# Patient Record
Sex: FEMALE | Race: WHITE | NOT HISPANIC OR LATINO | Employment: FULL TIME | URBAN - NONMETROPOLITAN AREA
[De-identification: names, ages, dates, MRNs, and addresses within clinical notes are randomized per-mention and may not be internally consistent; named-entity substitution may affect disease eponyms.]

---

## 2021-09-05 ENCOUNTER — HOSPITAL ENCOUNTER (OUTPATIENT)
Facility: OTHER | Age: 31
Discharge: OTHER INSTITUTION | End: 2021-09-05
Attending: STUDENT IN AN ORGANIZED HEALTH CARE EDUCATION/TRAINING PROGRAM | Admitting: STUDENT IN AN ORGANIZED HEALTH CARE EDUCATION/TRAINING PROGRAM
Payer: COMMERCIAL

## 2021-09-05 ENCOUNTER — HOSPITAL ENCOUNTER (EMERGENCY)
Facility: OTHER | Age: 31
Discharge: STILL ADMITTED OR ADMINISTRATIVE | End: 2021-09-05
Payer: COMMERCIAL

## 2021-09-05 VITALS
HEIGHT: 67 IN | SYSTOLIC BLOOD PRESSURE: 129 MMHG | HEART RATE: 122 BPM | WEIGHT: 160 LBS | OXYGEN SATURATION: 97 % | RESPIRATION RATE: 18 BRPM | TEMPERATURE: 98.6 F | DIASTOLIC BLOOD PRESSURE: 70 MMHG | BODY MASS INDEX: 25.11 KG/M2

## 2021-09-05 LAB
ABO/RH(D): NORMAL
ANTIBODY SCREEN: NEGATIVE
CLUE CELLS: ABNORMAL
FFN SPECIMEN INTEGRITY: NORMAL
FIBRONECTIN FETAL VAG QL: NEGATIVE
SPECIMEN EXPIRATION DATE: NORMAL
TRICHOMONAS, WET PREP: ABNORMAL
WBC'S/HIGH POWER FIELD, WET PREP: ABNORMAL
YEAST, WET PREP: ABNORMAL

## 2021-09-05 PROCEDURE — 36415 COLL VENOUS BLD VENIPUNCTURE: CPT | Performed by: STUDENT IN AN ORGANIZED HEALTH CARE EDUCATION/TRAINING PROGRAM

## 2021-09-05 PROCEDURE — C9803 HOPD COVID-19 SPEC COLLECT: HCPCS

## 2021-09-05 PROCEDURE — 87081 CULTURE SCREEN ONLY: CPT | Performed by: STUDENT IN AN ORGANIZED HEALTH CARE EDUCATION/TRAINING PROGRAM

## 2021-09-05 PROCEDURE — 87210 SMEAR WET MOUNT SALINE/INK: CPT | Performed by: STUDENT IN AN ORGANIZED HEALTH CARE EDUCATION/TRAINING PROGRAM

## 2021-09-05 PROCEDURE — 82731 ASSAY OF FETAL FIBRONECTIN: CPT | Performed by: STUDENT IN AN ORGANIZED HEALTH CARE EDUCATION/TRAINING PROGRAM

## 2021-09-05 PROCEDURE — 96374 THER/PROPH/DIAG INJ IV PUSH: CPT

## 2021-09-05 PROCEDURE — 96372 THER/PROPH/DIAG INJ SC/IM: CPT | Mod: XU | Performed by: STUDENT IN AN ORGANIZED HEALTH CARE EDUCATION/TRAINING PROGRAM

## 2021-09-05 PROCEDURE — 85027 COMPLETE CBC AUTOMATED: CPT | Performed by: STUDENT IN AN ORGANIZED HEALTH CARE EDUCATION/TRAINING PROGRAM

## 2021-09-05 PROCEDURE — U0003 INFECTIOUS AGENT DETECTION BY NUCLEIC ACID (DNA OR RNA); SEVERE ACUTE RESPIRATORY SYNDROME CORONAVIRUS 2 (SARS-COV-2) (CORONAVIRUS DISEASE [COVID-19]), AMPLIFIED PROBE TECHNIQUE, MAKING USE OF HIGH THROUGHPUT TECHNOLOGIES AS DESCRIBED BY CMS-2020-01-R: HCPCS | Performed by: STUDENT IN AN ORGANIZED HEALTH CARE EDUCATION/TRAINING PROGRAM

## 2021-09-05 PROCEDURE — 250N000011 HC RX IP 250 OP 636: Performed by: STUDENT IN AN ORGANIZED HEALTH CARE EDUCATION/TRAINING PROGRAM

## 2021-09-05 PROCEDURE — G0463 HOSPITAL OUTPT CLINIC VISIT: HCPCS | Mod: 25

## 2021-09-05 PROCEDURE — 258N000003 HC RX IP 258 OP 636: Performed by: STUDENT IN AN ORGANIZED HEALTH CARE EDUCATION/TRAINING PROGRAM

## 2021-09-05 PROCEDURE — 86762 RUBELLA ANTIBODY: CPT | Performed by: STUDENT IN AN ORGANIZED HEALTH CARE EDUCATION/TRAINING PROGRAM

## 2021-09-05 PROCEDURE — 96375 TX/PRO/DX INJ NEW DRUG ADDON: CPT

## 2021-09-05 PROCEDURE — 86901 BLOOD TYPING SEROLOGIC RH(D): CPT | Performed by: STUDENT IN AN ORGANIZED HEALTH CARE EDUCATION/TRAINING PROGRAM

## 2021-09-05 PROCEDURE — 99215 OFFICE O/P EST HI 40 MIN: CPT | Performed by: STUDENT IN AN ORGANIZED HEALTH CARE EDUCATION/TRAINING PROGRAM

## 2021-09-05 RX ORDER — BETAMETHASONE SODIUM PHOSPHATE AND BETAMETHASONE ACETATE 3; 3 MG/ML; MG/ML
12 INJECTION, SUSPENSION INTRA-ARTICULAR; INTRALESIONAL; INTRAMUSCULAR; SOFT TISSUE ONCE
Status: COMPLETED | OUTPATIENT
Start: 2021-09-05 | End: 2021-09-05

## 2021-09-05 RX ORDER — ONDANSETRON 2 MG/ML
4 INJECTION INTRAMUSCULAR; INTRAVENOUS EVERY 6 HOURS PRN
Status: DISCONTINUED | OUTPATIENT
Start: 2021-09-05 | End: 2021-09-06 | Stop reason: HOSPADM

## 2021-09-05 RX ORDER — SODIUM CHLORIDE, SODIUM LACTATE, POTASSIUM CHLORIDE, CALCIUM CHLORIDE 600; 310; 30; 20 MG/100ML; MG/100ML; MG/100ML; MG/100ML
INJECTION, SOLUTION INTRAVENOUS CONTINUOUS
Status: DISCONTINUED | OUTPATIENT
Start: 2021-09-06 | End: 2021-09-06 | Stop reason: HOSPADM

## 2021-09-05 RX ADMIN — BETAMETHASONE SODIUM PHOSPHATE AND BETAMETHASONE ACETATE 12 MG: 3; 3 INJECTION, SUSPENSION INTRA-ARTICULAR; INTRALESIONAL; INTRAMUSCULAR at 22:25

## 2021-09-05 RX ADMIN — SODIUM CHLORIDE, POTASSIUM CHLORIDE, SODIUM LACTATE AND CALCIUM CHLORIDE: 600; 310; 30; 20 INJECTION, SOLUTION INTRAVENOUS at 23:31

## 2021-09-05 RX ADMIN — SODIUM CHLORIDE 5 MILLION UNITS: 900 INJECTION INTRAVENOUS at 23:07

## 2021-09-05 RX ADMIN — ONDANSETRON 4 MG: 2 INJECTION INTRAMUSCULAR; INTRAVENOUS at 22:27

## 2021-09-05 RX ADMIN — SODIUM CHLORIDE, POTASSIUM CHLORIDE, SODIUM LACTATE AND CALCIUM CHLORIDE 1000 ML: 600; 310; 30; 20 INJECTION, SOLUTION INTRAVENOUS at 22:20

## 2021-09-05 ASSESSMENT — MIFFLIN-ST. JEOR: SCORE: 1478.39

## 2021-09-06 ENCOUNTER — TRANSFERRED RECORDS (OUTPATIENT)
Dept: HEALTH INFORMATION MANAGEMENT | Facility: OTHER | Age: 31
End: 2021-09-06

## 2021-09-06 LAB
ERYTHROCYTE [DISTWIDTH] IN BLOOD BY AUTOMATED COUNT: 13.3 % (ref 10–15)
HCT VFR BLD AUTO: 32.3 % (ref 35–47)
HGB BLD-MCNC: 10.9 G/DL (ref 11.7–15.7)
MCH RBC QN AUTO: 31.1 PG (ref 26.5–33)
MCHC RBC AUTO-ENTMCNC: 33.7 G/DL (ref 31.5–36.5)
MCV RBC AUTO: 92 FL (ref 78–100)
PLATELET # BLD AUTO: 113 10E3/UL (ref 150–450)
RBC # BLD AUTO: 3.51 10E6/UL (ref 3.8–5.2)
SARS-COV-2 RNA RESP QL NAA+PROBE: NEGATIVE
WBC # BLD AUTO: 7.4 10E3/UL (ref 4–11)

## 2021-09-06 NOTE — PROGRESS NOTES
Pt presents to [facility] complaining of cramping and back pain. She also states she has intermittent nausea. She has a history of delivering a 26 week baby vaginally. EFM monitors applied. Cervix 1.5-250/-2. Because of her  delivery, she has had close monitoring, 2 weeks ago her cervix was closed. She denies alcohol or drug use during this pregnancy.     The patient is not from around here. She is up here camping.

## 2021-09-06 NOTE — H&P
[facility] Labor and Delivery Admission H&P    Ivet Houston MRN# [id number]   Age: 31 year old YOB: 1990     Date of Admission:  2021    Primary care provider: No Ref-Primary, Physician           Chief Complaint:   Ivet Houston is a 31 year old  at 34w4d admitted for  contractions and early cervical change in the setting of history of  labor and delivery at 26 weeks.          Pregnancy history:   This pregnancy has been complicated by history of  delivery at 26 weeks with her first son-- notes it was true  labor, no sign of PPROM, III or trauma that led to these early contractions.    Ivet is visiting [place] on a camping trip with her family this weekend. She has had all of her prenatal care in the [place] area. She noted contractions that started around noon today and thought they were mendel-mahmood contractions, but they did not go away after an attempt at oral hydration. She notes they continued to get stronger and that prompted her to arrive to the ER. When she arrived to our labor and delivery unit she continued to contract regularly and they were getting more uncomfortable. She was 2cm dilated and commented to our team that she was closed on her last check last week.     She denies any leaking of fluid, vaginal bleeding. She notes good fetal movements of her baby boy.     This pregnancy is also complicated by anemia for which she has been taking iron supplementation.    OBSTETRIC HISTORY:  :  labor and delivery at 26 weeks gestation, baby boy, doing  great   G2: Current    PRENATAL LABS:   All prenatal care at [place] facility    MEDICATIONS:  Prenatal vitamins  Ferrous sulfate  Omega-3 fatty acids  Colace        Maternal Past Medical History:   She denies any acute or chronic medical conditions  She specifically denies asthma, HTN, DM or history of VTE    SURGICAL HISTORY:  [place] teeth removal  No prior abdominal  surgeries                GYN HISTORY:  -No history of any prior STIs    ALLERGIES:  No known medication allergies        Family History:   Brother: PE, unprovoked-- possibly due to prolonged venous stasis as he sits for long periods of time during work  Maternal grandmother: PE, unknown conditions surrounding this            Social History:   She lives at home with her  and son  Works for [employer] as a nurse  Denies tobacco, alcohol and drug use         Review of Systems:   ROS:   Skin: negative for rash, bruising  Eyes: negative for visual blurring, double vision  Ears/Nose/Throat: negative for nasal congestion, vertigo  Respiratory: No shortness of breath, dyspnea on exertion, cough, or hemoptysis  Cardiovascular: negative for palpitations, chest pain, lower extremity edema and syncope or near-syncope  Gastrointestinal: negative for, nausea, vomiting and hematemesis  Genitourinary: negative for, dysuria, frequency and urgency, +uterine irritability and contractions, -LOF or VB  Musculoskeletal: negative for, back pain and muscular weakness  Neurologic: negative for, headaches, syncope, seizures and local weakness  Psychiatric: negative for, anxiety, depression and hallucinations  Hematologic/Lymphatic/Immunologic: negative for, anemia, chills and fever           Physical Exam:   Gen: Alert and oriented, No acute distress, well-appearing  CV: Normal heart rate to mild tachycardia with labor, regular rhythm, no audible murmur or gallop  Resp: Lungs clear to auscultation, non-labored respirations  Abd: Soft, gravid, intermittent contractions palpated, no point tenderness  Ext: No sign of asymmetric edema, erythema, or asymmetric size. No pain with movement, Negative Eric's sign    Cervix: 2/60/-2  Membranes: intact  EFW by Leopolds: 2100 gm  Presentation:Cephalic confirmed by bedside US    FHT: 140 bpm baseline, moderate  variability, - accelerations, no decelerations (Cat 1)  [place]: 4-5 per 10 minutes         Assessment:   Ivet Houston is a 31 year old  at 34w4d by stated gestational age who presented with  contractions and concern for early  labor. This pregnancy is complicated by history of 26 week  labor and delivery with her G1 pregnancy        Plan:     #  IUP:  -- SVE: 260/-2  -- [place]: 4-5 q10 min  -- Membranes: intact  -- Confirmed cephalic by bsus  -- IVF bolus running  -- GBS swab collected- pending: starting PCN for prophylaxis (5million units given 11:10 pm)  -- FFN negative  -- Wet prep negative on admission  -- Betamethasone 12mg IM dose given 10:45 pm  -- Coordination to transfer to [facility] Labor and Delivery for concern of  labor to have NICU available: discussed transfer with accepting physician, Dr. Foreman    # History of  delivery  -- G1 pregnancy: 26 week labor and delivery: boy doing well    Not using [place] in this pregnancy   Notes CL have all been normal in early pregnancy    # Anemia  -- Hgb 10.0 on 2021: ferrous sulfate daily  -- CBC pending here    # FWB  -- CEFM: 140 bpm baseline, moderate  variability, - accelerations, no decelerations (Cat 1)  -- EFW: 2100 gms by leopolds    # PNL  -- Results in Care Everywhere from  2021:       Absc neg, HIV neg, RPRnr, HbAgs neg, Hep C neg      Collecting blood type, Rubella (blood type from 2017 pregnancy in CareEverywhere noted to be A+)  -- GBS collected on arrival here: pending   PCN started for GBS unknown in setting of  labor  -- 1hr GTT: 79    # Obstetric history  -- : 26 week  labor and delivery   G2: Current      AUSTIN RAMOS MD on 2021 at 10:39 PM

## 2021-09-06 NOTE — PROGRESS NOTES
Call placed to Dr. Goetz to update her on pt's arrival/status. Orders obtained. Dr. Goetz will present to dept to likely transfer pt to higher level of care with NICU.

## 2021-09-06 NOTE — ED TRIAGE NOTES
"ED Nursing Triage Note (General)   ________________________________    Ivet AUTUMN Houston is a 31 year old Female that presents to triage via private vehicle with complaints of \"feeling off all day\".  Patient states intermittent lower back pain and lower pelvic cramping.  Patient states hx of  labor and states she is concerned about going into  labor.  Patient states cramping was every 2 minutes 1 hour prior to arrival, now cramping is every 5 minutes.  Patient denies changes in discharge, no leaking or vaginal bleeding noted.   Significant symptoms had onset 2 hours ago.  There were no vitals taken for this visit.t  Patient appears alert behavior.  GCS-15  Airway: intact  Breathing noted as Normal  Action taken: 3      PRE HOSPITAL PRIOR LIVING SITUATION-home  "

## 2021-09-07 LAB
RUBV IGG SERPL QL IA: 1.12 INDEX
RUBV IGG SERPL QL IA: POSITIVE

## 2021-09-08 LAB — BACTERIA SPEC CULT: NORMAL

## 2022-08-23 ENCOUNTER — TRANSCRIBE ORDERS (OUTPATIENT)
Dept: ONCOLOGY | Facility: CLINIC | Age: 32
End: 2022-08-23

## 2022-08-23 ENCOUNTER — PATIENT OUTREACH (OUTPATIENT)
Dept: ONCOLOGY | Facility: CLINIC | Age: 32
End: 2022-08-23

## 2022-08-23 DIAGNOSIS — D69.6 DECREASED PLATELET COUNT (H): Primary | ICD-10-CM

## 2022-08-23 NOTE — PROGRESS NOTES
Referral received for benign heme services, see below.    Referral reason: thrombocytopenia    Current abnormal labs: available in CE    Outreach: Call not placed to patient regarding referral.    Plan: Internal Referral: No additional work-up needed, scheduling instructions updated, referral transferred to NPS for completion.

## 2022-08-24 NOTE — PROGRESS NOTES
RECORDS STATUS - ALL OTHER DIAGNOSIS    Decreased platelet count (H)  RECORDS RECEIVED FROM: [facility]   DATE RECEIVED: 10/19/2022    Action    Action Taken 8/24/2022 9AM KEJEFF     I called pt Ivet- unavailable.       NOTES STATUS DETAILS   OFFICE NOTE from referring provider     DISCHARGE SUMMARY from hospital     DISCHARGE REPORT from the ER     CLINICAL TRIAL TREATMENTS TO DATE     LABS     PATHOLOGY REPORTS     ANYTHING RELATED TO DIAGNOSIS Complete CE- Labs last updated on 8/18/2022   GENONOMIC TESTING     TYPE:     IMAGING (NEED IMAGES & REPORT)     CT SCANS     MRI     MAMMO     ULTRASOUND     PET

## 2022-10-19 ENCOUNTER — PRE VISIT (OUTPATIENT)
Dept: ONCOLOGY | Facility: CLINIC | Age: 32
End: 2022-10-19

## (undated) RX ORDER — SODIUM CHLORIDE, SODIUM LACTATE, POTASSIUM CHLORIDE, CALCIUM CHLORIDE 600; 310; 30; 20 MG/100ML; MG/100ML; MG/100ML; MG/100ML
INJECTION, SOLUTION INTRAVENOUS
Status: DISPENSED
Start: 2021-09-05